# Patient Record
Sex: MALE | URBAN - METROPOLITAN AREA
[De-identification: names, ages, dates, MRNs, and addresses within clinical notes are randomized per-mention and may not be internally consistent; named-entity substitution may affect disease eponyms.]

---

## 2021-11-17 ENCOUNTER — OFFICE VISIT (OUTPATIENT)
Dept: URBAN - METROPOLITAN AREA CLINIC 98 | Facility: CLINIC | Age: 41
End: 2021-11-17
Payer: SELF-PAY

## 2021-11-17 DIAGNOSIS — K21.9 GERD WITHOUT ESOPHAGITIS: ICD-10-CM

## 2021-11-17 DIAGNOSIS — R06.00 DYSPNEA ON EXERTION: ICD-10-CM

## 2021-11-17 PROCEDURE — 99243 OFF/OP CNSLTJ NEW/EST LOW 30: CPT | Performed by: INTERNAL MEDICINE

## 2021-11-17 PROCEDURE — 99203 OFFICE O/P NEW LOW 30 MIN: CPT | Performed by: INTERNAL MEDICINE

## 2021-11-17 RX ORDER — PANTOPRAZOLE SODIUM 40 MG/1
1 TABLET TABLET, DELAYED RELEASE ORAL ONCE A DAY
Qty: 30 | OUTPATIENT
Start: 2021-11-17

## 2021-11-17 NOTE — HPI-TODAY'S VISIT:
Patient referred from Kesha Cazares ( patient cannot remember name of his PCP ) for chronic RODGER.  Copy of this consult sent to Kesha Cazares. 42 yo pt who 2 mos ago, he started to complain of TRUJILLO @ work, wo CP, palpitations nor dizzy spells. Has no Hx CVD nor FHx CAD. He reports a hx 1 ppd tobacco use x 10 yrs, none x 2 yrs. He has concomitant,frequent throat clearing, w occasional dysphagia to solids wo food impaction. No nocturnal RODGER and denies abdominal pain, anorexia or weight loss. 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 Normal bm's and denies melenic stools nor hematochezia. Had COVID-19 2019 and has received his first dose of COVID-19. Had normal labs a mo ago. No other complaints.

## 2021-12-21 ENCOUNTER — OFFICE VISIT (OUTPATIENT)
Dept: URBAN - METROPOLITAN AREA CLINIC 98 | Facility: CLINIC | Age: 41
End: 2021-12-21
Payer: SELF-PAY

## 2021-12-21 DIAGNOSIS — K21.9 GERD WITHOUT ESOPHAGITIS: ICD-10-CM

## 2021-12-21 DIAGNOSIS — K52.9 CHRONIC DIARRHEA: ICD-10-CM

## 2021-12-21 PROCEDURE — 99214 OFFICE O/P EST MOD 30 MIN: CPT | Performed by: INTERNAL MEDICINE

## 2021-12-21 RX ORDER — PANTOPRAZOLE SODIUM 40 MG/1
1 TABLET TABLET, DELAYED RELEASE ORAL ONCE A DAY
Qty: 30 | Status: ACTIVE | COMMUNITY
Start: 2021-11-17

## 2021-12-21 RX ORDER — PANTOPRAZOLE SODIUM 40 MG/1
1 TABLET TABLET, DELAYED RELEASE ORAL ONCE A DAY
Qty: 30 | OUTPATIENT

## 2021-12-21 NOTE — HPI-TODAY'S VISIT:
40 yo pt who 2 mos ago, he started to complain of TRUJILLO @ work, wo CP, palpitations nor dizzy spells. Has no Hx CVD nor FHx CAD. He reports a hx 1 ppd tobacco use x 10 yrs, none x 2 yrs.  Had a normal CXR. Today, he c/o  throat clearing,  occasional nausea, wo dysphagia / odynophagia. Intermittent daytime RODGER sxs. No nocturnal RODGER and denies abdominal pain, anorexia or weight loss. lately, some p-prandial epigastric pain resolved w bm's.   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 Normal bm's and denies melenic stools nor hematochezia but admits to intermittent loose non-bloody stools. No anorexia or weight loss.  Had COVID-19 2019 and has received two doses of COVID-19 vaccine.  Had normal labs 3 mos ago. No other complaints.

## 2021-12-23 LAB
A/G RATIO: 2.2
ALBUMIN: 5.1
ALKALINE PHOSPHATASE: 95
ALT (SGPT): 22
AST (SGOT): 18
BILIRUBIN, TOTAL: 0.5
BUN/CREATININE RATIO: 16
BUN: 14
C-REACTIVE PROTEIN, QUANT: 1
CALCIUM: 9.7
CARBON DIOXIDE, TOTAL: 23
CHLORIDE: 103
CREATININE: 0.9
DEAMIDATED GLIADIN ABS, IGA: 4
DEAMIDATED GLIADIN ABS, IGG: 2
EGFR IF AFRICN AM: 123
EGFR IF NONAFRICN AM: 106
ENDOMYSIAL ANTIBODY IGA: NEGATIVE
GLOBULIN, TOTAL: 2.3
GLUCOSE: 98
HEMATOCRIT: 45
HEMOGLOBIN: 15.8
IMMUNOGLOBULIN A, QN, SERUM: 213
MCH: 32.1
MCHC: 35.1
MCV: 92
NRBC: (no result)
PLATELETS: 358
POTASSIUM: 4.2
PROTEIN, TOTAL: 7.4
RBC: 4.92
RDW: 12.8
SODIUM: 139
T-TRANSGLUTAMINASE (TTG) IGA: <2
T-TRANSGLUTAMINASE (TTG) IGG: <2
WBC: 6.1

## 2022-01-03 ENCOUNTER — OFFICE VISIT (OUTPATIENT)
Dept: URBAN - METROPOLITAN AREA SURGERY CENTER 18 | Facility: SURGERY CENTER | Age: 42
End: 2022-01-03
Payer: SELF-PAY

## 2022-01-03 ENCOUNTER — CLAIMS CREATED FROM THE CLAIM WINDOW (OUTPATIENT)
Dept: URBAN - METROPOLITAN AREA CLINIC 4 | Facility: CLINIC | Age: 42
End: 2022-01-03
Payer: SELF-PAY

## 2022-01-03 DIAGNOSIS — K29.60 ADENOPAPILLOMATOSIS GASTRICA: ICD-10-CM

## 2022-01-03 DIAGNOSIS — K31.A0 GASTRIC INTESTINAL METAPLASIA, UNSPECIFIED: ICD-10-CM

## 2022-01-03 DIAGNOSIS — K21.9 ACID REFLUX: ICD-10-CM

## 2022-01-03 DIAGNOSIS — K31.89 DUODENAL ERYTHEMA: ICD-10-CM

## 2022-01-03 DIAGNOSIS — K21.9 GASTRO-ESOPHAGEAL REFLUX DISEASE WITHOUT ESOPHAGITIS: ICD-10-CM

## 2022-01-03 DIAGNOSIS — K29.81 DUODENITIS WITH BLEEDING: ICD-10-CM

## 2022-01-03 DIAGNOSIS — K29.80 ACUTE DUODENITIS: ICD-10-CM

## 2022-01-03 PROCEDURE — 88305 TISSUE EXAM BY PATHOLOGIST: CPT | Performed by: PATHOLOGY

## 2022-01-03 PROCEDURE — 43239 EGD BIOPSY SINGLE/MULTIPLE: CPT | Performed by: INTERNAL MEDICINE

## 2022-01-03 PROCEDURE — 88342 IMHCHEM/IMCYTCHM 1ST ANTB: CPT | Performed by: PATHOLOGY

## 2022-01-03 PROCEDURE — G8907 PT DOC NO EVENTS ON DISCHARG: HCPCS | Performed by: INTERNAL MEDICINE

## 2022-01-06 ENCOUNTER — TELEPHONE ENCOUNTER (OUTPATIENT)
Dept: URBAN - METROPOLITAN AREA CLINIC 98 | Facility: CLINIC | Age: 42
End: 2022-01-06

## 2022-01-06 ENCOUNTER — LAB OUTSIDE AN ENCOUNTER (OUTPATIENT)
Dept: URBAN - METROPOLITAN AREA CLINIC 98 | Facility: CLINIC | Age: 42
End: 2022-01-06

## 2022-01-06 RX ORDER — PANTOPRAZOLE SODIUM 40 MG/1
1 TABLET TABLET, DELAYED RELEASE ORAL ONCE A DAY
Qty: 90 | Refills: 3 | OUTPATIENT
Start: 2022-01-07

## 2022-01-11 LAB
C DIFFICILE TOXIN GENE NAA: NEGATIVE
C DIFFICILE TOXINS A+B, EIA: NEGATIVE
CALPROTECTIN, FECAL: <16
CAMPYLOBACTER CULTURE: (no result)
E COLI SHIGA TOXIN EIA: NEGATIVE
Lab: (no result)
Lab: (no result)
PANCREATIC ELASTASE, FECAL: 66
SALMONELLA/SHIGELLA SCREEN: (no result)

## 2022-01-18 ENCOUNTER — OFFICE VISIT (OUTPATIENT)
Dept: URBAN - METROPOLITAN AREA CLINIC 98 | Facility: CLINIC | Age: 42
End: 2022-01-18
Payer: SELF-PAY

## 2022-01-18 DIAGNOSIS — K86.81 EXOCRINE PANCREATIC INSUFFICIENCY: ICD-10-CM

## 2022-01-18 DIAGNOSIS — K26.9 DUODENAL ULCER: ICD-10-CM

## 2022-01-18 DIAGNOSIS — K52.9 CHRONIC DIARRHEA: ICD-10-CM

## 2022-01-18 DIAGNOSIS — R10.31 RIGHT LOWER QUADRANT ABDOMINAL PAIN: ICD-10-CM

## 2022-01-18 DIAGNOSIS — K21.9 GERD WITHOUT ESOPHAGITIS: ICD-10-CM

## 2022-01-18 PROCEDURE — 99214 OFFICE O/P EST MOD 30 MIN: CPT | Performed by: INTERNAL MEDICINE

## 2022-01-18 RX ORDER — PANCRELIPASE 36000; 180000; 114000 [USP'U]/1; [USP'U]/1; [USP'U]/1
AS DIRECTED CAPSULE, DELAYED RELEASE PELLETS ORAL
Qty: 250 | Refills: 3 | OUTPATIENT
Start: 2022-01-18 | End: 2022-05-18

## 2022-01-18 RX ORDER — PANTOPRAZOLE SODIUM 40 MG/1
1 TABLET TABLET, DELAYED RELEASE ORAL ONCE A DAY
Qty: 90 | Refills: 3 | Status: ACTIVE | COMMUNITY
Start: 2022-01-07

## 2022-01-18 RX ORDER — LANSOPRAZOLE 30 MG
1 CAPSULE BEFORE A MEAL CAPSULE,DELAYED RELEASE (ENTERIC COATED) ORAL ONCE A DAY
Qty: 30 | Refills: 3 | OUTPATIENT
Start: 2022-01-18

## 2022-01-18 RX ORDER — PANTOPRAZOLE SODIUM 40 MG/1
1 TABLET TABLET, DELAYED RELEASE ORAL ONCE A DAY
Qty: 30 | Status: ACTIVE | COMMUNITY

## 2022-01-18 RX ORDER — PANCRELIPASE 30000; 6000; 19000 [USP'U]/1; [USP'U]/1; [USP'U]/1
AS DIRECTED CAPSULE, DELAYED RELEASE PELLETS ORAL
Qty: 90 | Refills: 6 | OUTPATIENT
Start: 2022-01-18 | End: 2023-10-10

## 2022-01-18 NOTE — HPI-TODAY'S VISIT:
42 yo pt here for RODGER and diarrhe follow up. He still has c/o SOB wo PND, orthopnea, CP, palipitations and no pedal edema.   Had a normal CXR. Today, he c/o  throat clearing,  occasional nausea, wo dysphagia / odynophagia. Intermittent daytime RODGER sxs, and no nocturnal RODGER abdominal discomfort wo radiation. No urologic sxs.  He has persistent diarrhea, loose stools 6 to 7 x day, wo bleeding,nor melenic stools nor steatorrheic stools. Occasional semi-formed stools. Lately, w R-sided abdominal pain wo radiation, unrelated to eating.  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 Labs: normal CRP, negative C. diff and Elastase of 66. 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   Had COVID-19 2019 and has received two doses of COVID-19 vaccine.  No other complaints.

## 2022-01-19 LAB
FERRITIN, SERUM: 52
IRON BIND.CAP.(TIBC): 353
IRON SATURATION: 32
IRON: 114
UIBC: 239

## 2022-03-10 ENCOUNTER — OFFICE VISIT (OUTPATIENT)
Dept: URBAN - METROPOLITAN AREA CLINIC 98 | Facility: CLINIC | Age: 42
End: 2022-03-10
Payer: SELF-PAY

## 2022-03-10 VITALS
HEIGHT: 64 IN | TEMPERATURE: 96.9 F | WEIGHT: 153.4 LBS | HEART RATE: 63 BPM | DIASTOLIC BLOOD PRESSURE: 87 MMHG | BODY MASS INDEX: 26.19 KG/M2 | SYSTOLIC BLOOD PRESSURE: 142 MMHG

## 2022-03-10 DIAGNOSIS — K26.9 DUODENAL ULCER: ICD-10-CM

## 2022-03-10 DIAGNOSIS — R06.02 SHORTNESS OF BREATH: ICD-10-CM

## 2022-03-10 DIAGNOSIS — K21.9 GERD WITHOUT ESOPHAGITIS: ICD-10-CM

## 2022-03-10 DIAGNOSIS — K86.81 EXOCRINE PANCREATIC INSUFFICIENCY: ICD-10-CM

## 2022-03-10 DIAGNOSIS — R10.31 RIGHT LOWER QUADRANT ABDOMINAL PAIN: ICD-10-CM

## 2022-03-10 DIAGNOSIS — K52.9 CHRONIC DIARRHEA: ICD-10-CM

## 2022-03-10 PROCEDURE — 99214 OFFICE O/P EST MOD 30 MIN: CPT | Performed by: INTERNAL MEDICINE

## 2022-03-10 RX ORDER — PANTOPRAZOLE SODIUM 40 MG/1
1 TABLET TABLET, DELAYED RELEASE ORAL ONCE A DAY
Qty: 90 | Refills: 3 | Status: ACTIVE | COMMUNITY
Start: 2022-01-07

## 2022-03-10 RX ORDER — PANCRELIPASE 36000; 180000; 114000 [USP'U]/1; [USP'U]/1; [USP'U]/1
AS DIRECTED CAPSULE, DELAYED RELEASE PELLETS ORAL
Qty: 250 | Refills: 3 | OUTPATIENT

## 2022-03-10 RX ORDER — PANTOPRAZOLE SODIUM 40 MG/1
1 TABLET TABLET, DELAYED RELEASE ORAL ONCE A DAY
Qty: 30 | Status: ACTIVE | COMMUNITY

## 2022-03-10 RX ORDER — PANCRELIPASE 36000; 180000; 114000 [USP'U]/1; [USP'U]/1; [USP'U]/1
AS DIRECTED CAPSULE, DELAYED RELEASE PELLETS ORAL
Qty: 250 | Refills: 3 | Status: ACTIVE | COMMUNITY
Start: 2022-01-18 | End: 2022-05-18

## 2022-03-10 RX ORDER — PANCRELIPASE 30000; 6000; 19000 [USP'U]/1; [USP'U]/1; [USP'U]/1
AS DIRECTED CAPSULE, DELAYED RELEASE PELLETS ORAL
Qty: 90 | Refills: 6 | OUTPATIENT

## 2022-03-10 RX ORDER — LANSOPRAZOLE 30 MG
1 CAPSULE BEFORE A MEAL CAPSULE,DELAYED RELEASE (ENTERIC COATED) ORAL ONCE A DAY
Qty: 30 | Refills: 3 | OUTPATIENT

## 2022-03-10 RX ORDER — PANCRELIPASE 30000; 6000; 19000 [USP'U]/1; [USP'U]/1; [USP'U]/1
AS DIRECTED CAPSULE, DELAYED RELEASE PELLETS ORAL
Qty: 90 | Refills: 6 | Status: ACTIVE | COMMUNITY
Start: 2022-01-18 | End: 2023-10-10

## 2022-03-10 RX ORDER — LANSOPRAZOLE 30 MG
1 CAPSULE BEFORE A MEAL CAPSULE,DELAYED RELEASE (ENTERIC COATED) ORAL ONCE A DAY
Qty: 30 | Refills: 3 | Status: ACTIVE | COMMUNITY
Start: 2022-01-18

## 2022-03-10 NOTE — HPI-TODAY'S VISIT:
40 yo pt here for RODGER and diarrhe follow up. He still has c/o SOB wo PND, orthopnea, CP, palipitations and no pedal edema.   Had a normal CXR. Today, he c/o  throat clearing. Intermittent daytime RODGER sxs, and no nocturnal RODGER abdominal discomfort wo radiation controlled w Pantoprazol. Diarrhea much improved since he started Creon: 2 semi formed bm's /d and no nocturnal bm's. No urologic sxs.  A + P CT scan and SIBO-BT.  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 Labs: normal CRP, negative C. diff and Elastase of 66. 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   Had COVID-19 2019 and has received two doses of COVID-19 vaccine.  No other complaints.

## 2022-04-26 ENCOUNTER — OFFICE VISIT (OUTPATIENT)
Dept: URBAN - METROPOLITAN AREA CLINIC 98 | Facility: CLINIC | Age: 42
End: 2022-04-26
Payer: SELF-PAY

## 2022-04-26 VITALS
HEART RATE: 69 BPM | TEMPERATURE: 97.4 F | SYSTOLIC BLOOD PRESSURE: 122 MMHG | WEIGHT: 152.6 LBS | HEIGHT: 64 IN | BODY MASS INDEX: 26.05 KG/M2 | DIASTOLIC BLOOD PRESSURE: 72 MMHG

## 2022-04-26 DIAGNOSIS — R06.02 SHORTNESS OF BREATH: ICD-10-CM

## 2022-04-26 DIAGNOSIS — R20.2 PARESTHESIA OF BOTH FEET: ICD-10-CM

## 2022-04-26 DIAGNOSIS — K21.9 GERD WITHOUT ESOPHAGITIS: ICD-10-CM

## 2022-04-26 DIAGNOSIS — K86.81 EXOCRINE PANCREATIC INSUFFICIENCY: ICD-10-CM

## 2022-04-26 PROCEDURE — 99214 OFFICE O/P EST MOD 30 MIN: CPT | Performed by: INTERNAL MEDICINE

## 2022-04-26 RX ORDER — GABAPENTIN 300 MG/1
1 CAPSULE CAPSULE ORAL ONCE A DAY
Status: ACTIVE | COMMUNITY

## 2022-04-26 RX ORDER — PANCRELIPASE 30000; 6000; 19000 [USP'U]/1; [USP'U]/1; [USP'U]/1
AS DIRECTED CAPSULE, DELAYED RELEASE PELLETS ORAL
Qty: 90 | Refills: 6 | Status: ACTIVE | COMMUNITY

## 2022-04-26 RX ORDER — LANSOPRAZOLE 30 MG
1 CAPSULE BEFORE A MEAL CAPSULE,DELAYED RELEASE (ENTERIC COATED) ORAL ONCE A DAY
Qty: 30 | Refills: 3 | OUTPATIENT

## 2022-04-26 RX ORDER — LANSOPRAZOLE 30 MG
1 CAPSULE BEFORE A MEAL CAPSULE,DELAYED RELEASE (ENTERIC COATED) ORAL ONCE A DAY
Qty: 30 | Refills: 3 | Status: ON HOLD | COMMUNITY

## 2022-04-26 RX ORDER — PANCRELIPASE 36000; 180000; 114000 [USP'U]/1; [USP'U]/1; [USP'U]/1
AS DIRECTED CAPSULE, DELAYED RELEASE PELLETS ORAL
Qty: 250 | Refills: 3 | Status: ON HOLD | COMMUNITY

## 2022-04-26 RX ORDER — PANTOPRAZOLE SODIUM 40 MG/1
1 TABLET TABLET, DELAYED RELEASE ORAL ONCE A DAY
Qty: 30 | Status: ACTIVE | COMMUNITY

## 2022-04-26 RX ORDER — PANTOPRAZOLE SODIUM 40 MG/1
1 TABLET TABLET, DELAYED RELEASE ORAL ONCE A DAY
Qty: 90 | Refills: 3 | Status: ON HOLD | COMMUNITY
Start: 2022-01-07

## 2022-04-26 NOTE — HPI-TODAY'S VISIT:
43 yo pt here for RODGER and diarrhea follow up. He feels better. Having normal formed bm's wo bleeding nor mucus. No diarrhea. One loose stool a mo ago. Still has c/o SOB wo PND, orthopnea, CP, palipitations and no pedal edema.   Had a normal CXR.  No urologic sxs.  A + P CT scan normal x for a pulmonary nodule and SIBO-BT. still pensing. EGD 12/21: GERD, peptic duodenitis, and chronic Hp-negative gastritis w normal duodenal bx's. Labs: normal CRP, negative C. diff and Elastase of 66. 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  Seen by Dr. Hunter christian LE paresthesias and precribed Gabapentin and Escitalopram for anxiety, the latter he's not taking.   Had COVID-19 2019 and has received two doses of COVID-19 vaccine.  No other complaints.

## 2022-04-28 PROBLEM — 51868009: Status: ACTIVE | Noted: 2022-01-18

## 2022-07-26 ENCOUNTER — OFFICE VISIT (OUTPATIENT)
Dept: URBAN - METROPOLITAN AREA CLINIC 98 | Facility: CLINIC | Age: 42
End: 2022-07-26
Payer: SELF-PAY

## 2022-07-26 VITALS
WEIGHT: 153.6 LBS | HEART RATE: 56 BPM | DIASTOLIC BLOOD PRESSURE: 80 MMHG | HEIGHT: 64 IN | SYSTOLIC BLOOD PRESSURE: 134 MMHG | BODY MASS INDEX: 26.22 KG/M2 | TEMPERATURE: 98 F

## 2022-07-26 DIAGNOSIS — R20.2 PARESTHESIA OF BOTH FEET: ICD-10-CM

## 2022-07-26 DIAGNOSIS — R06.02 SHORTNESS OF BREATH: ICD-10-CM

## 2022-07-26 DIAGNOSIS — K21.9 GERD WITHOUT ESOPHAGITIS: ICD-10-CM

## 2022-07-26 DIAGNOSIS — K86.81 EXOCRINE PANCREATIC INSUFFICIENCY: ICD-10-CM

## 2022-07-26 DIAGNOSIS — R53.82 CHRONIC FATIGUE: ICD-10-CM

## 2022-07-26 PROBLEM — 309087008: Status: ACTIVE | Noted: 2022-04-28

## 2022-07-26 PROBLEM — 84229001: Status: ACTIVE | Noted: 2022-07-26

## 2022-07-26 PROCEDURE — 99214 OFFICE O/P EST MOD 30 MIN: CPT | Performed by: INTERNAL MEDICINE

## 2022-07-26 RX ORDER — PANCRELIPASE 30000; 6000; 19000 [USP'U]/1; [USP'U]/1; [USP'U]/1
AS DIRECTED CAPSULE, DELAYED RELEASE PELLETS ORAL
Qty: 90 | Refills: 6 | Status: ACTIVE | COMMUNITY

## 2022-07-26 RX ORDER — LANSOPRAZOLE 30 MG
1 CAPSULE BEFORE A MEAL CAPSULE,DELAYED RELEASE (ENTERIC COATED) ORAL ONCE A DAY
Qty: 30 | Refills: 3 | Status: ON HOLD | COMMUNITY

## 2022-07-26 RX ORDER — PANTOPRAZOLE SODIUM 40 MG/1
1 TABLET TABLET, DELAYED RELEASE ORAL ONCE A DAY
Qty: 90 | Refills: 3 | Status: ON HOLD | COMMUNITY
Start: 2022-01-07

## 2022-07-26 RX ORDER — PANCRELIPASE 36000; 180000; 114000 [USP'U]/1; [USP'U]/1; [USP'U]/1
AS DIRECTED CAPSULE, DELAYED RELEASE PELLETS ORAL
Qty: 250 | Refills: 3 | Status: ON HOLD | COMMUNITY

## 2022-07-26 RX ORDER — GABAPENTIN 300 MG/1
1 CAPSULE CAPSULE ORAL ONCE A DAY
Status: ON HOLD | COMMUNITY

## 2022-07-26 RX ORDER — LANSOPRAZOLE 30 MG
1 CAPSULE BEFORE A MEAL CAPSULE,DELAYED RELEASE (ENTERIC COATED) ORAL ONCE A DAY
Qty: 30 | Refills: 3 | OUTPATIENT

## 2022-07-26 RX ORDER — PANTOPRAZOLE SODIUM 40 MG/1
1 TABLET TABLET, DELAYED RELEASE ORAL ONCE A DAY
Qty: 30 | Status: ON HOLD | COMMUNITY

## 2022-07-26 NOTE — HPI-TODAY'S VISIT:
41 yo pt here for RODGER and diarrhea follow up. He feels better. Having normal formed bm's wo bleeding nor mucus when on Creon. Off Creon diarrrhea recurres. Today, on Creon tid w normal formed stools . No diarrhea. Still has c/o SOB wo PND, orthopnea, CP, palipitations and no pedal edema and hasn't seen Pulmonary as recommended.   Had a normal CXR.  No urologic sxs.  A + P CT scan normal x for a pulmonary nodule and SIBO-BT. still pending.  He feels fatigued at time w insomnia. EGD 12/21: GERD, peptic duodenitis, and chronic Hp-negative gastritis w normal duodenal bx's. Labs: normal CRP, negative C. diff and Elastase of 66. 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  Seen by Dr. Hunter christian LE paresthesias and precribed Gabapentin and Escitalopram for anxiety, the latter he's not taking. No PCP follow up for evaluation of genera;ized anxiety disorder.   Had COVID-19 2019 and has received two doses of COVID-19 vaccine.  No other complaints.

## 2022-07-27 LAB
A/G RATIO: 1.6
ALBUMIN: 4.8
ALKALINE PHOSPHATASE: 88
ALT (SGPT): 22
AST (SGOT): 22
BILIRUBIN, TOTAL: 0.5
BUN/CREATININE RATIO: (no result)
BUN: 20
C-REACTIVE PROTEIN, QUANT: 0.9
CALCIUM: 9.9
CARBON DIOXIDE, TOTAL: 25
CHLORIDE: 106
CREATININE: 0.92
EGFR: 107
GLOBULIN, TOTAL: 3
GLUCOSE: 87
HEMATOCRIT: 46.8
HEMOGLOBIN: 16.3
MCH: 31
MCHC: 34.8
MCV: 89
MPV: 10.1
PLATELET COUNT: 307
POTASSIUM: 4.9
PROTEIN, TOTAL: 7.8
RDW: 13.3
RED BLOOD CELL COUNT: 5.26
SODIUM: 143
TSH W/REFLEX TO FT4: 1.13
WHITE BLOOD CELL COUNT: 5.3

## 2022-09-02 LAB — PANCREATIC ELASTASE, FECAL: 96

## 2023-02-03 ENCOUNTER — TELEPHONE ENCOUNTER (OUTPATIENT)
Dept: URBAN - METROPOLITAN AREA CLINIC 98 | Facility: CLINIC | Age: 43
End: 2023-02-03

## 2023-02-03 RX ORDER — PANCRELIPASE 30000; 6000; 19000 [USP'U]/1; [USP'U]/1; [USP'U]/1
AS DIRECTED CAPSULE, DELAYED RELEASE PELLETS ORAL
Qty: 90 | Refills: 2

## 2023-03-31 ENCOUNTER — DASHBOARD ENCOUNTERS (OUTPATIENT)
Age: 43
End: 2023-03-31

## 2023-03-31 ENCOUNTER — OFFICE VISIT (OUTPATIENT)
Dept: URBAN - METROPOLITAN AREA TELEHEALTH 2 | Facility: TELEHEALTH | Age: 43
End: 2023-03-31
Payer: SELF-PAY

## 2023-03-31 DIAGNOSIS — K21.9 GERD WITHOUT ESOPHAGITIS: ICD-10-CM

## 2023-03-31 DIAGNOSIS — F41.1 GENERALIZED ANXIETY DISORDER: ICD-10-CM

## 2023-03-31 DIAGNOSIS — K86.81 EXOCRINE PANCREATIC INSUFFICIENCY: ICD-10-CM

## 2023-03-31 PROBLEM — 266435005: Status: ACTIVE | Noted: 2021-11-17

## 2023-03-31 PROBLEM — 21897009: Status: ACTIVE | Noted: 2023-03-31

## 2023-03-31 PROCEDURE — 99214 OFFICE O/P EST MOD 30 MIN: CPT | Performed by: INTERNAL MEDICINE

## 2023-03-31 RX ORDER — PANCRELIPASE 36000; 180000; 114000 [USP'U]/1; [USP'U]/1; [USP'U]/1
AS DIRECTED CAPSULE, DELAYED RELEASE PELLETS ORAL
Qty: 250 | Refills: 2 | Status: ACTIVE | COMMUNITY

## 2023-03-31 RX ORDER — LANSOPRAZOLE 30 MG
1 CAPSULE BEFORE A MEAL CAPSULE,DELAYED RELEASE (ENTERIC COATED) ORAL ONCE A DAY
Qty: 30 | Refills: 3 | Status: ACTIVE | COMMUNITY

## 2023-03-31 RX ORDER — PANTOPRAZOLE SODIUM 40 MG/1
1 TABLET TABLET, DELAYED RELEASE ORAL ONCE A DAY
Qty: 30 | Status: ON HOLD | COMMUNITY

## 2023-03-31 RX ORDER — PANCRELIPASE 30000; 6000; 19000 [USP'U]/1; [USP'U]/1; [USP'U]/1
AS DIRECTED CAPSULE, DELAYED RELEASE PELLETS ORAL
Qty: 90 | Refills: 2

## 2023-03-31 RX ORDER — PANCRELIPASE 30000; 6000; 19000 [USP'U]/1; [USP'U]/1; [USP'U]/1
AS DIRECTED CAPSULE, DELAYED RELEASE PELLETS ORAL
Qty: 90 | Refills: 2 | Status: ACTIVE | COMMUNITY

## 2023-03-31 RX ORDER — GABAPENTIN 300 MG/1
1 CAPSULE CAPSULE ORAL ONCE A DAY
Status: ON HOLD | COMMUNITY

## 2023-03-31 RX ORDER — PANCRELIPASE 36000; 180000; 114000 [USP'U]/1; [USP'U]/1; [USP'U]/1
AS DIRECTED CAPSULE, DELAYED RELEASE PELLETS ORAL
Qty: 250 | Refills: 2

## 2023-03-31 RX ORDER — PANTOPRAZOLE SODIUM 40 MG/1
1 TABLET TABLET, DELAYED RELEASE ORAL ONCE A DAY
Qty: 90 | Refills: 3 | Status: ON HOLD | COMMUNITY
Start: 2022-01-07

## 2023-03-31 NOTE — HPI-TODAY'S VISIT:
This is a telehealth OV to which patient has agreed to. Limitations of telehealth discussed, pt understands and agrees to proceed. Patient's @ home during this virtual OV.  44 yo pt here for RODGER and diarrhea follow up. He had diarrhea last week for several days after eating a heavy meal. No abdominal pain, fever, chills and no bleeding. Today, he reports feeling much better; earlier today had a formed bm's wo mucus / blood / nor tenesmus. Having normal formed bm's wo bleeding nor mucus when on Creon. Off Creon diarrrhea recurres. Today, back on Creon tid w normal formed stools . No diarrhea.  Had a normal CXR.  No urologic sxs.  A + P CT scan normal x for a pulmonary nodule and SIBO-BT not done as yet.  He feels fatigued at time w insomnia. EGD 12/21: GERD, peptic duodenitis, and chronic Hp-negative gastritis w normal duodenal bx's. Labs: normal CRP, negative C. diff and Elastase of 66. 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  Seen by Dr. Harrison for LE paresthesias and prescribed Gabapentin and Escitalopram for anxiety, the latter he's not taking. No PCP follow up for evaluation of generalized anxiety disorder.   Had COVID-19 2019 and has received two doses of COVID-19 vaccine.  No other complaints.

## 2023-12-28 ENCOUNTER — TELEPHONE ENCOUNTER (OUTPATIENT)
Dept: URBAN - METROPOLITAN AREA CLINIC 98 | Facility: CLINIC | Age: 43
End: 2023-12-28

## 2023-12-28 RX ORDER — PANCRELIPASE 36000; 180000; 114000 [USP'U]/1; [USP'U]/1; [USP'U]/1
AS DIRECTED CAPSULE, DELAYED RELEASE PELLETS ORAL THREE TIMES A DAY
Qty: 360 CAPSULE | Refills: 3 | OUTPATIENT
Start: 2023-12-28 | End: 2024-12-22

## 2024-01-03 ENCOUNTER — TELEPHONE ENCOUNTER (OUTPATIENT)
Dept: URBAN - METROPOLITAN AREA CLINIC 98 | Facility: CLINIC | Age: 44
End: 2024-01-03

## 2024-01-03 RX ORDER — PANCRELIPASE 30000; 6000; 19000 [USP'U]/1; [USP'U]/1; [USP'U]/1
AS DIRECTED CAPSULE, DELAYED RELEASE PELLETS ORAL
Qty: 90 | Refills: 2
End: 2024-10-08

## 2024-01-03 RX ORDER — PANCRELIPASE 36000; 180000; 114000 [USP'U]/1; [USP'U]/1; [USP'U]/1
AS DIRECTED CAPSULE, DELAYED RELEASE PELLETS ORAL
Qty: 250 | Refills: 2
End: 2024-04-11

## 2024-01-23 ENCOUNTER — TELEPHONE ENCOUNTER (OUTPATIENT)
Dept: URBAN - METROPOLITAN AREA CLINIC 98 | Facility: CLINIC | Age: 44
End: 2024-01-23